# Patient Record
Sex: MALE | Race: WHITE
[De-identification: names, ages, dates, MRNs, and addresses within clinical notes are randomized per-mention and may not be internally consistent; named-entity substitution may affect disease eponyms.]

---

## 2017-10-11 NOTE — EDPHY
H & P


Stated Complaint: Not sleeping, depressed, anxious, vague SI (no plan)


Time Seen by Provider: 10/11/17 08:12


HPI/ROS: 





CHIEF COMPLAINT:  Depression, insomnia





HISTORY OF PRESENT ILLNESS:  The patient is a 37-year-old man who comes to the 

emergency department stating that he has not been able to sleep much in the 

last 6 days.  He is feeling depressed.  He denies suicidality.  He states that 

he went to an urgent care yesterday and was prescribed Ambien 10 mg. He took 

this and got about 3 hours of sleep and then could not sleep the rest of the 

night.  He does have a history of depression and once was on an SSRI but was 

able to taper himself off of it several years ago and began exercising more.  

He states however that he began having problems this January after the election 

when he irrationally quit his job.  Since that time has not been employed.  He 

states that he has been doing a lot of reading and taking online classes.  He 

denies drug or alcohol abuse.








REVIEW OF SYSTEMS:


Constitutional:  denies: chills, fever, recent illness, recent injury


EENTM: denies: blurred vision, double vision, nose congestion


Respiratory: denies: cough, shortness of breath


Cardiac: denies: chest pain, irregular heart rate, lightheadedness, palpitations


Gastrointestinal/Abdominal: denies: abdominal pain, diarrhea, nausea, vomiting, 

blood streaked stools


Genitourinary: denies: dysuria, frequency, hematuria, pain


Musculoskeletal: denies: joint pain, muscle pain


Skin: denies: lesions, rash, jaundice, bruising


Neurological: denies: headache, numbness, paresthesia, tingling, dizziness, 

weakness


Hematologic/Lymphatic: denies: blood clots, easy bleeding, easy bruising


Immunologic/allergic: denies: HIV/AIDS, transplant








EXAM:


GENERAL:  Well-appearing, well-nourished and in no acute distress.


HEAD:  Atraumatic, normocephalic.


EYES:  Pupils equal round and reactive to light, extraocular movements intact, 

sclera anicteric, conjunctiva are normal.


ENT:  TMs normal, nares patent, oropharynx clear without exudates.  Moist 

mucous membranes.


NECK:  Normal range of motion, supple without lymphadenopathy or JVD.


LUNGS:  Breath sounds clear to auscultation bilaterally and equal.  No wheezes 

rales or rhonchi.


HEART:  Regular rate and rhythm without murmurs, rubs or gallops.


ABDOMEN:  Soft, nontender, normoactive bowel sounds.  No guarding, no rebound.  

No masses appreciated.


BACK:  No CVA tenderness, no spinal tenderness, step-offs or deformities


EXTREMITIES:  Normal range of motion, no pitting or edema.  No clubbing or 

cyanosis.


NEUROLOGICAL:  Cranial nerves II through XII grossly intact.  Normal speech, 

normal gait.  5/5 strength, normal movement in all extremities, normal sensation


PSYCH:  Normal mood, normal affect.


SKIN:  Depressed affect, slightly slow speech, answers all questions 

appropriately.





Source: Patient


Exam Limitations: No limitations





- Personal History


Current Tetanus Diphtheria and Acellular Pertussis (TDAP): Yes





- Medical/Surgical History


Hx Asthma: No


Hx Chronic Respiratory Disease: No


Hx Diabetes: No


Hx Cardiac Disease: No


Hx Renal Disease: No


Hx Cirrhosis: No


Hx Alcoholism: No


Other PMH: anxiety/panic attacks





- Family History


Significant Family History: No pertinent family hx





- Social History


Smoking Status: Current every day smoker


Alcohol Use: Sober


Drug Use: None


Constitutional: 


 Initial Vital Signs











Temperature (C)  36.7 C   10/11/17 07:51


 


Heart Rate  67   10/11/17 07:51


 


Respiratory Rate  16   10/11/17 07:51


 


Blood Pressure  135/79 H  10/11/17 07:51


 


O2 Sat (%)  98   10/11/17 07:51








 











O2 Delivery Mode               Room Air














Allergies/Adverse Reactions: 


 





No Known Allergies Allergy (Unverified 10/11/17 07:55)


 








Home Medications: 














 Medication  Instructions  Recorded


 


Temazepam [Restoril 15 MG (*)] 15 mg PO HSPRN PRN #10 cap 10/11/17














Medical Decision Making


ED Course/Re-evaluation: 





10:00  a.m. the patient is medically clear, awaiting psychiatric evaluation





11:45 a.m. the patient has been evaluated by Mental Health.  He is not 

suicidal.  He contracts for safety.  He will follow up with the walk-in clinic.

  I will prescribe her Restoril an attempt to help with insomnia.  Discussed 

indications for returning.


Differential Diagnosis: 





Partial list of the Differential diagnosis considered include but were not 

limited to;  insomnia, depression and although unlikely based on the history 

and physical exam, I also considered bipolar, head injury, infection, psychosis

, suicidality.  I discussed these differential diagnoses and the plan with the 

patient as well as the usual and expected course.  The patient understands that 

the diagnosis is provisional and that in medicine we are not always correct and 

that further workup is often warranted.  Usual and customary warnings were 

given.  All of the patient's questions were answered.  The patient was 

instructed to return to the emergency department should the symptoms at all 

worsen or return, otherwise to followup with the physician as we discussed.





- Data Points


Laboratory Results: 


 Laboratory Results





 10/11/17 09:12 





 10/11/17 09:12 











Departure





- Departure


Disposition: Home, Routine, Self-Care


Clinical Impression: 


Insomnia


Qualifiers:


 Insomnia type: unspecified Qualified Code(s): G47.00 - Insomnia, unspecified





Depression


Qualifiers:


 Depression Type: major depressive disorder Major depression recurrence: 

recurrent Active/Remission status: currently active Major depression episode 

severity: moderate Qualified Code(s): F33.1 - Major depressive disorder, 

recurrent, moderate





Condition: Fair


Instructions:  Temazepam (By mouth), Depression (ED), Insomnia (ED)


Referrals: 


NONE *PRIMARY CARE P,. [Primary Care Provider] - As per Instructions


[facility],. [Clinic] - As per Instructions


Prescriptions: 


Temazepam [Restoril 15 MG (*)] 15 mg PO HSPRN PRN #10 cap


 PRN Reason: Insomnia

## 2017-10-13 NOTE — EDPHY
H & P


Source: Patient





- Medical/Surgical History


Hx Asthma: No


Hx Chronic Respiratory Disease: No


Hx Diabetes: No


Hx Cardiac Disease: No


Hx Renal Disease: No


Hx Cirrhosis: No


Hx Alcoholism: No


Other PMH: anxiety/panic attacks





- Social History


Smoking Status: Current every day smoker


Time Seen by Provider: 10/13/17 18:21


HPI/ROS: 





CHIEF COMPLAINT:  depression, insomnia, suicidal ideation 





HISTORY OF PRESENT ILLNESS:  37-year-old male arrives via police on an M1 hold 

after endorsing suicidal ideation with plan to hang  himself.  He owns a gun.  

He denies homicidal ideation.  Denies complaints of physical pain or 

discomfort.  States that he has not slept in 6 days.  Use in the ER 2 days ago 

for complaints of depression insomnia, evaluated by mental health evaluated at 

time discharged home.











REVIEW OF SYSTEMS:


A ten point review of systems was performed and is negative with the exception 

of the items mentioned in the HPI








PAST MEDICAL & SURGICAL  HISTORY:  Depression.  Insomnia.





SOCIAL HISTORY: denies alcohol or drug use, denies methamphetamine use.    





FAMILY HISTORY: No pertinent family history    








************


PHYSICAL EXAM





(Prior to examination, patient consented to physical exam, hands were washed 

and my usual and customary physical exam procedures followed)


1) GENERAL: Well-developed, well-nourished, alert and oriented.  Depressed, 

flat affect .


2) HEAD: Normocephalic, atraumatic


3) HEENT: Pupils equal, round, reactive to light bilaterally.  Sclera 

anicteric. 


4) NECK: Full range of motion, no meningeal signs.


5) LUNGS: Clear auscultation bilaterally, no wheezes, no rhonchi, no 

retractions.   


6) HEART: Regular rate and rhythm, no murmur, no heave, no gallop.


7) ABDOMEN: No guarding, no rebound, no focal tenderness,


8) MUSCULOSKELETAL: No peripheral edema or discoloration.


9) BACK: , no obvious trauma, no visual or palpable abnormality. 


10) SKIN: No rash, no petechiae. 


[11) Psychiatric:  Patient is oriented X 3, there is no agitation.  Depressed, 

flat affect, intermittently crying








***************





DIFFERENTIAL DIAGNOSIS:  [in no particular order including but not limited to 

depression, suicidal ideation, homicidal ideation 


 (GABI Clifton Erika)


Constitutional: 





 Initial Vital Signs











Temperature (C)  36.9 C   10/13/17 18:20


 


Heart Rate  63   10/13/17 18:20


 


Respiratory Rate  16   10/13/17 18:20


 


Blood Pressure  137/85 H  10/13/17 18:20


 


O2 Sat (%)  98   10/13/17 18:20








 











O2 Delivery Mode               Room Air














Allergies/Adverse Reactions: 


 





No Known Allergies Allergy (Verified 10/13/17 18:51)


 








Home Medications: 














 Medication  Instructions  Recorded


 


Temazepam [Restoril 15 MG (*)] 15 mg PO HSPRN PRN #10 cap 10/11/17


 


Ambien  10/13/17














Medical Decision Making


ED Course/Re-evaluation: 





Patient has been accepted for admission to [facility] Dr. Enio Swenson accepting physician, SHRAVAN paperwork completed.  Care 

of patient under supervision of  secondary supervising physician Dr Hoover .  (

GABI Clifton Erika)





I did not see this patient while he was in the emergency department.  However 

his care was discussed with the PA while the patient was in the department.  I 

agree with treatment plan management (Erlin Hoover)





- Data Points


Laboratory Results: 





 Laboratory Results





 10/13/17 18:40 





 10/13/17 18:40 





 











  10/13/17 10/13/17 10/13/17





  18:40 18:40 18:40


 


WBC      7.16 10^3/uL 10^3/uL





     (3.80-9.50) 


 


RBC      4.77 10^6/uL 10^6/uL





     (4.40-6.38) 


 


Hgb      14.5 g/dL g/dL





     (13.7-17.5) 


 


Hct      42.0 % %





     (40.0-51.0) 


 


MCV      88.1 fL fL





     (81.5-99.8) 


 


MCH      30.4 pg pg





     (27.9-34.1) 


 


MCHC      34.5 g/dL g/dL





     (32.4-36.7) 


 


RDW      11.9 % %





     (11.5-15.2) 


 


Plt Count      229 10^3/uL 10^3/uL





     (150-400) 


 


MPV      9.2 fL fL





     (8.7-11.7) 


 


Neut % (Auto)      64.7 % %





     (39.3-74.2) 


 


Lymph % (Auto)      24.4 % %





     (15.0-45.0) 


 


Mono % (Auto)      9.6 % %





     (4.5-13.0) 


 


Eos % (Auto)      0.3 % L %





     (0.6-7.6) 


 


Baso % (Auto)      0.7 % %





     (0.3-1.7) 


 


Nucleat RBC Rel Count      0.0 % %





     (0.0-0.2) 


 


Absolute Neuts (auto)      4.63 10^3/uL 10^3/uL





     (1.70-6.50) 


 


Absolute Lymphs (auto)      1.75 10^3/uL 10^3/uL





     (1.00-3.00) 


 


Absolute Monos (auto)      0.69 10^3/uL 10^3/uL





     (0.30-0.80) 


 


Absolute Eos (auto)      0.02 10^3/uL L 10^3/uL





     (0.03-0.40) 


 


Absolute Basos (auto)      0.05 10^3/uL 10^3/uL





     (0.02-0.10) 


 


Absolute Nucleated RBC      0.00 10^3/uL 10^3/uL





     (0-0.01) 


 


Immature Gran %      0.3 % %





     (0.0-1.1) 


 


Immature Gran #      0.02 10^3/uL 10^3/uL





     (0.00-0.10) 


 


Sodium    137 mEq/L mEq/L  





    (134-144)  


 


Potassium    4.2 mEq/L mEq/L  





    (3.5-5.2)  


 


Chloride    102 mEq/L mEq/L  





    ()  


 


Carbon Dioxide    25 mEq/l mEq/l  





    (22-31)  


 


Anion Gap    10 mEq/L mEq/L  





    (8-16)  


 


BUN    15 mg/dL mg/dL  





    (7-23)  


 


Creatinine    0.9 mg/dL mg/dL  





    (0.7-1.3)  


 


Estimated GFR    > 60   





    


 


Glucose    100 mg/dL mg/dL  





    ()  


 


Calcium    9.7 mg/dL mg/dL  





    (8.5-10.4)  


 


Salicylates    < 1.0 mg/dL L mg/dL  





    (2.0-20.0)  


 


Urine Opiates Screen  NEGATIVE     





   (NEGATIVE)   


 


Acetaminophen    < 10 mcg/mL L mcg/mL  





    (10-30)  


 


Urine Barbiturates  NEGATIVE     





   (NEGATIVE)   


 


Ur Phencyclidine Scrn  NEGATIVE     





   (NEGATIVE)   


 


Ur Amphetamine Screen  NEGATIVE     





   (NEGATIVE)   


 


U Benzodiazepines Scrn  NON-NEGATIVE  H     





   (NEGATIVE)   


 


Urine Cocaine Screen  NEGATIVE     





   (NEGATIVE)   


 


U Marijuana (THC) Screen  NEGATIVE     





   (NEGATIVE)   


 


Ethyl Alcohol    < 10 mg/dL mg/dL  





    (0-10)  











Medications Given: 





 








Discontinued Medications





Lorazepam (Ativan)  1 mg PO EDNOW ONE


   Stop: 10/13/17 19:17


   Last Admin: 10/13/17 19:18 Dose:  1 mg


Nicotine (Nicoderm Cq)  21 mg TD EDNOW ONE


   Stop: 10/13/17 18:28


   Last Admin: 10/13/17 19:13 Dose:  21 mg








Departure





- Departure


Disposition: [facility] IP


Clinical Impression: 


 Suicidal ideation, Severe major depression





Condition: Fair


Referrals: 


Patient,NotPresent [Primary Care Provider] - As per Instructions

## 2017-10-14 NOTE — HOSPPROG
Hospitalist Progress Note


Assessment/Plan: 





Reason for consultation:  Medical evaluation





History of present illness: Pt is a 36yo M who p/w insomnia x 7 days. He felt 

like a switch turned on in his body and he has been unable to sleep. He has 

tried using Ambien and temazepam, but neither helped. He tried taking lorazepam 

2mg tabs, which helped slightly. He does not feel tired during the day. He 

admits that he has a lot of thoughts going through his mind and finds it 

difficult to shut them down to sleep. He does not consume caffeine. He does not 

do stimulating activities prior to going to sleep at night. He has been 

frustrated with lack of sleep and is wondering if there is some sort of nerve 

poisoning that he might have suffered. This has never happened to him in the 

past. 





Past medical history: none.





Past surgical history:  none.





Medications:  Please see med rec form.





Allergies:  NKA.





Social history: Denies alcohol or drug use. +cigarette smoker - 1ppd. 





Family history: Father - bipolar. 





Review of systems:  10 point review of systems was conducted and is negative 

except per HPI





Physical exam:


Vitals:  Reviewed


General: The patient is a male who is alert and in no acute distress. 


HEENT: normocephalic, extraocular movements intact, conjunctivae clear, no 

lesions on face. Nares and oral mucosa pink and moist. 


Neck: trachea midline, no visible masses, no external lesions. 


CV: +S1/S2, RRR, no MRG.


Resp: unlabored, CTAB no RRW.


Abd: soft and nondistended.


Musculoskeletal:  Normal gait.


Neuro: cranial nerves II  XII grossly intact. Intact gross motor and sensory 

function.


Psych: anxious mood/appropriate affect. 


Skin:  no pallor.





Labs: 


CBC-WBC 7, HGB 14.5, platelets 229. BMP:  Sodium 137, potassium 4.2, chloride 

102, CO2 25, BUN 15, creatinine 0.9, glucose 100, calcium 9.7.  U tox-positive 

for benzos.  Negative for alcohol in blood.





Impression and plan:


Insomnia


Query hypomanic or manic episode





-Pt is medically clear for Psych eval and Tx. 


-Consider to start Seroquel or trazodone to help pt sleep at night. 


-Currently pt has benzo prn insomnia which might help a little, but this is not 

a good long term Tx. 


-Cont nicotine patch. 





Objective: 


 Vital Signs











Temp Pulse Resp BP Pulse Ox


 


 37.2 C   74   14   125/75 H  98 


 


 10/13/17 23:54  10/14/17 01:02  10/14/17 01:02  10/14/17 01:02  10/14/17 01:02














ICD10 Worksheet


Patient Problems: 


 Problems











Problem Status Onset


 


Depression Acute  


 


Insomnia Acute

## 2017-10-14 NOTE — BAPA
[f rep st]



                                                  ADMISSION PSYCHIATRIC ASSESSMENT





DATE OF SERVICE:  10/14/2017



CHIEF COMPLAINT:  The patient says "I don't feel suicidal anymore.  I just want to go home and sleep 
in my own bed."



HISTORY OF PRESENT ILLNESS:  This is a 37-year-old man, mixed  descent, brought to the ED by raymond cervantes on an M1 hold after endorsing suicidal ideation with a plan to hang himself.  Also had a plan to 
shoot himself.  The patient initially went to the [facility] looking for h
elp.  They gave him an address for the [facility]SU.  He says that he got into a cab.  The cab took him to [facility]
t he thought was the address, and it turns out that it was a dance studio.  He says that he then went
 back to the walk-in clinic, and they told him that the CSU was in [place].  According to the cyndi quintero, he then just went home and says that the police showed up at his house with an Edge worker, an
d the Edge worker filled out an M1 hold, which states "Client states that he will kill himself if he 
goes home tonight.  Client states he cannot take it anymore, has not slept in 6 days.  Client is very
 anxious.  Client has access to a 0.380 Glock per client report."  The patient told the [facility] evaluator that he has felt depressed and sad over the past week.  Approximat
ely 6 days ago, he began to have almost nightly panic attacks.  Due to these, he has had very little 
sleep for multiple days.  His PCP prescribed him Ambien and then temazepam, neither were effective ac
cording to the patient.  On his own he took Benadryl.  It did not work either.  He tried running, an 
activity that helps him to relax and helped alleviate his depression in the past, but says that it wo
uld not help.  When he presented to the walk-in clinic earlier today, he describes feelings of hopele
ssness, fear, paranoia, insomnia, loss of interest in activities, and SI.  The patient says that he h
as been discouraged about his future because he was let go from his job in January.  It is not clear 
what the circumstances were.  He has not been able to find employment since then.  He feels like he i
s "a failure."  He says that there is not much that he does that he enjoys.  He feels guilty, is disa
ppointed in himself, more critical of himself than he used to be.  The patient says that he has not g
luke any mental health treatment for a long time.  It has been more than 10 years since he saw a cou
nselor or took any medication for his mood.  He says he has been living on saved income and trying to
 change careers, but says that he has not been very successful, and this has been a source of a lot o
f anxiety and frustration, which has been going on actually for several months.  He says it has just 
been worse over the past week or so that he started having panic attacks.  He said that he also ended
 on again, off again relationship with his girlfriend after 2 years recently.  A relative who has bee
n staying with him moved out.  He says that he has begun to wonder "Will I always be alone?"  He had 
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 because he was running out of money, and he needed to work again. He said he has been very
 discouraged because he did not hear back from any of the places that he applied.  He says that has g
luke him even more down and depressed, and made him feel more like a failure, but says that he has b
een waiting to hear back from one place in particular.



PAST PSYCHIATRIC HISTORY:  The patient states that he has felt anxious and depressed since he was a c
hild, though never formally diagnosed, and has never been as severe as he is experiencing now.  He ha
d 1 episode of depression in his early 20s that he says was similar to what he is experiencing now.  
He had been suspended for 2 years by  for setting off fire crackers.  He says that he felt very iso
lated at that time.  He lost most of his friends, and people that he knew had moved away, and he was 
2 years behind in college.  He says back then that he had thoughts similar to what he is having now, 
thinking "I would rather just die," but no plans and no attempts.  He did see a counselor during this
 time.  He also saw a psychiatrist who placed him on Paxil.  He says that the Paxil was "not a good e
xperience."  He said that it "didn't work out very well for me."  He felt like the medication was not
 helpful, and he said that he had some intolerable side effects, which he says had to do with feeling
 like he saw colors in his peripheral vision.  He denies any other side effects or adverse experience
s from being on the SSRI, but said that it made him not want to take medications again.  He says, "I 
really am not interested in being on antidepressants." 



The patient has no prior history of psychiatric hospitalizations, and has not seen a psychiatrist sin
ce the episode in his 20s.  He has also not seen a counselor since that time.  His PCP was prescribin
g medications for insomnia, but was not addressing his underlying depression or what sounds like 
jeff anxiety.  He was getting temazepam and Ambien, which he says were not very helpful.  When this MD
 met with the patient, the patient severely minimized his symptoms, and stated that he had checked hi
s e-mail this morning with the care coordinator, Carrie, and he said that he got a job offer from one 
of the places where he had applied.  He said that he felt "really stoked" and denied that he was feel
ing sad or depressed or anxious any longer.  He also slept 5-1/2 hours last night after taking 2 mg o
f Ativan, said it was "the best sleep I've had in a long time."  The patient feels like his mood has 
"turned the corner," and he no longer endorses feeling helpless or hopeless.  He no longer feels a fa
ilure.  He is not judgemental or self critical.  He denies anhedonia.  He is future oriented.  He say
s he is really excited to go to work for this company, and feels like all of his problems have been r
esolved overnight.  When MD attempted to ask the patient what had changed in terms of his coping stra
tegies or his resources to deal with adverse events, he acknowledged that things were really no diffe
rent.  When MD pointed out that the patient reports that he has had a lifelong problem with depressio
n and anxiety, particularly when circumstances in his life did not go well, the patient admitted that
 was true.  When MD asked what he planned to do in the future so that he did not get into a situation
 like he experienced in his early 20s and like he experienced over the last couple of months, the pat
effie had no real plan except that he said that he was willing to speak to a therapist.  He said "it w
ould probably help for me to have somebody to talk to."  The patient did not want to take medications
.  He refused to consider the option of being on an antidepressant, even when MD explained that SSRIs
, and SNRIs, and other similar medications in addition to being the first-line treatment for depressi
on or also anxiolytics, and they are also the first-line treatment for people with generalized anxiet
y disorder, as well as panic disorder.  The patient still declined to consent to taking medication.  
He said "now that I have heard about this job offer I am feeling better."  He denied any thoughts, pl
ans, or intents to hurt himself or anybody else.  He said "I have a list of names" of therapists that
 he had been given by friends, and he was going to call them, and he was going to make an appointment
 to see them as soon as possible.



ALLERGIES:  The patient reports that he has no known drug allergies.



CURRENT MEDICATIONS:  The patient is not currently taking any medications, although he has recently b
een taking temazepam, Ambien, and Benadryl for sleep.



PAST MEDICAL HISTORY:  He denies any past medical history.



PAST SURGERY HISTORY:  He denies any surgical history.



SOCIAL HISTORY:  The patient graduated college with a degree in math and computer science.  He lives 
alone in his own apartment in [place].  He states that he has several friends in [place] and in the [facility].  He left his job in January of 2017 under unclear circumstances, whether he was let 
go, fired, suspended, or quit.  He does not give any details.  The patient had a relative who had bee
n staying with him, but moved out recently.  He also ended is on again, off again relationship with h
is girlfriend after 2 years.



SUBSTANCE USE HISTORY:  The patient states that he drinks alcohol on the weekends.  He reports drinki
ng up to 3 beers and possibly 1 mixed drink, such as a Jennifer, per occasion.  He says that he does
 not drink Monday through Thursday, but drinks Friday, Saturday, and Sunday.  He states that he has b
een smoking marijuana regularly.  He says up until about a month ago he was smoking "heavily."  He wa
s vaping THC oil, and he says that he was smoking daily, but says that over the last 2 weeks he has n
ot smoked because he has been going on job interviews and having urine drug screens.  When MD asked, 
the patient denied using any other recreational substances, but he told the evaluator in the ED that 
he experimented with NMDA, LSD, and shrooms in the past, but did not say how recently.



FAMILY HISTORY:  The patient reports that his maternal grandmother has depression, and his brother se
es a counselor.  He does not know of anybody else in the family with a mental illness or with a subst
ance abuse problem.



LABS:  On admission are as follows:  White cell count was 7.16, hemoglobin was 14.5, hematocrit was 4
2, platelet count was 229, sodium was 137, potassium was 4.2, chloride was 102, BUN was 15, creatinin
e was 0.9, glucose was 100, calcium was 9.7 



Urine drug screen was positive for benzos, which he had been given in the ED, was negative for all ot
her drugs of abuse.  His salicylate and acetaminophen levels were both undetected.  His ethyl alcohol
 level was less than 10.



MENTAL STATUS EXAMINATION:  This is an average height, average build, otherwise healthy-appearing Asi
an American man.  He is pleasant and cooperative, but somewhat guarded and anxious during the intervi
ew with the MD.  His affect is anxious.  He is fidgeting, has difficulty making eye contact.  His moo
d he says "good."  His thought process is linear and goal directed.  His thought content reveals no e
vidence of psychosis.  He exhibits no signs or symptoms of cely.  He denies feeling depressed, sad, 
or hopeless at the current time, even though those were symptoms that he reported when he went to the
 walk-in clinic and to the ED evaluator last night.  He is alert and oriented x4.  His intellect appe
ars to be average, as evidenced by educational/occupational history, fund of knowledge, and vocabular
y.  His insight and judgment appear to be poor based on the fact that the patient is minimizing his s
ymptoms, and does not seem to be interested in treating what sounds like chronic depression and anxie
ty, which are exacerbated by situational stressors.



DIAGNOSES:  

1.  Major depressive disorder, recurrent, severe.

2.  Generalized anxiety disorder or anxiety not otherwise specified.

3.  Panic disorder without agoraphobia.

4.  Rule out substance induced mood disorder.

5.  Cannabis use disorder, severe.

6.  Alcohol use disorder, moderate.

7.  Psychosocial stressors include unemployed, financial problems, lack of social support, recent terry
ak up with his girlfriend.



PLAN OF TREATMENT:  

1.  Admit the patient to behavioral services inpatient unit on an M1 hold.

2.  Monitor closely for safety and on suicide precautions.  The patient is currently denying any thou
ghts, plans, or intent to hurt himself or anyone else.  He says he is no longer having suicidal thoug
hts, and that he feels "really stoked" about his future now that he has a job offer.

3.  The MD has reviewed the risks, benefits, side effects of several different antidepressant medicat
ions, including the class of medications, the SSRIs, that he has been on in the past.  MD went into s
ome detail to describe how the medications are helpful both for depression, as well as for treating a
nxiety, and panic disorder, and that it is better to treat anxiety with a long-term strategy rather t
reyes to treat it episodically, as he has been doing with benzodiazepines for panic and for insomnia.  
MD explained that these are symptoms of an underlying disorder that he is not currently being treated
 for.  The patient says that he understands this, but does not want to be on medication for his mood 
or for his anxiety.  He only wants to use medications as needed for insomnia.  He states that he is a
lso willing to talk to a therapist and a counselor.  MD said that it would be helpful for the patient
 to develop some coping strategies for dealing with stressors in his life when things seem overwhelmi
ng.  This is a significant trigger for the patient and has been, as the patient reports, for most of 
his life, but he has only ever addressed it once when he was in his 20s because he was referred by hi
[facility] to get mental health treatment, and now that he went to the walk-in clinic to get help, but
 does not want to pursue ongoing treatment now that he feels like things have "turned the corner" now
 that he has a job offer.

4.  We will engage the patient in individual, group, and milieu therapies.

5.  Estimated length of stay is 2-3 days.





Job #:  [id number]

## 2017-10-15 NOTE — SOAPPROG
SOAP Progress Note


Assessment/Plan: 


Assessment:








38 yo man with h/o depression, anxiety, cannabis use disorder and paranoid 

delusions. 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

t fears about colleagues talking about him. Brother also reports patient was 

worried that "people he saw with earbuds" were tracking him. Brother admits 

patient bought gun d/t fear and concerns. But brother says patient is usually 

able to hide his paranoia and delusions from others. 











10/15/17 13:28


1. MD talked to patient about trial of Zyprexa to help with thoughts, anxiety 

and to improve his sleep. He took one PRN dose of 5mg today and agreed to take 

one again tonight. 





Subjective: 


More collateral from brother indicates patient has likely had paranoid 

delusions since at least 12/2016 when he quit his job. [facility] has removed gun from 

patient's apartment. MD told patient that Zyprexa was likely to help with his 

worried and scared thoughts and help him be able to sleep better. After 

discussing r/b/se's, patient agreed to trial of Zypexa. He denies any SI/HI. 





Objective: 





 Vital Signs











Temp Pulse Resp BP Pulse Ox


 


 37.0 C   79   16   127/66 H  96 


 


 10/15/17 05:47  10/15/17 05:47  10/15/17 05:47  10/15/17 05:47  10/15/17 05:47








MSE: Lying in bed, c/o poor sleep (staff report he slept 6 hrs). Affect: 

Anxious  Mood: "Anxious"  TP: Disorganized, perseverative about sleep/anxiety  

TC: Denies any AH/VH, denies paranoia but this seems to underlie much of his 

anxiety, no SI/HI 





- Time Spent With Patient


Time Spent With Patient: 


20"








- Pending Discharge


Pending Discharge Within 24 Hours: No


Pending Discharge Within 48 Hours: No





ICD10 Worksheet


Patient Problems: 


 Problems











Problem Status Onset


 


Depression Acute  


 


Insomnia Acute

## 2017-10-16 NOTE — BDS
[f 
rep st]



                                                  BEHAVIORAL HEALTH DISCHARGE 
SUMMARY



DISCHARGE DATE 10/16/17



ADMITTING DIAGNOSES:  Major depressive disorder, recurrent, severe.  
Generalized anxiety disorder.  Panic disorder.  Suicidal ideation.



IDENTIFICATION:  This is a 37-year-old  male who is single with no 
children, who lives in an apartment with a dog and a cat.  Both his father and 
his brother live in the [place] area.  He has no prior history of psychiatric 
treatment.  He is a  but is unemployed currently.



BRIEF PSYCHIATRIC HISTORY:  The patient denies past psychiatric hospitalizations
, suicide attempts, violence toward others or any current outpatient mental 
health treatment.  Patient had gone to the [place] walk in clinic twice in the 
week prior to admission with complaints of severe insomnia.



BRIEF MEDICAL HISTORY:  Patient denies any traumatic brain injuries, seizures, 
or chronic severe medical problems.



FAMILY HISTORY:  The patient's father reportedly has bipolar disorder.



REASON FOR ADMISSION:  The patient was taken to the emergency department on a 
M1 mental health hold.  The patient reportedly went to a walk-in clinic here in 
[place] twice in the past week with complaints of insomnia.  During the 2nd 
visit, he reported suicidal thoughts to shoot himself or hang himself and was 
placed on an M1 hold and transferred to the emergency department.  The patient 
in the emergency department received 1 mg of Ativan.



INITIAL EXAM:  The patient is a thin male without tremors or weakness, who was 
a poor historian, who reported severe anxiety, panic attacks, depression 
symptoms, and brief thoughts to harm himself.  He denied violent thoughts or 
auditory hallucinations.  There was concern that he had had past paranoia about 
being monitored, but minimized these symptoms.



HOSPITAL COURSE:  The patient was started on olanzapine for insomnia, paranoia 
due to concern that the patient either had insomnia secondary to bipolar 
disorder or from a psychotic episode.  The patient tolerated olanzapine 5 mg at 
bedtime and had improvement in sleep, then had olanzapine 5mg twice on day #2.  
He had baseline blood work that showed a CBC was normal, BMP was within normal 
limits, urine drug screen was only positive for benzodiazepines.  However, the 
patient had been taking zolpidem and temazepam during the previous week.  The 
patient reported in the week prior to admission having severe insomnia (
multiple days without sleep) as well as racing thoughts, agitation, mood swings
, severe anxiety, and feelings of restlessness.



The patient, on the unit, was able to sleep 8 hours with a combination of 
olanzapine and lorazepam.  He received 5 mg of olanzapine during the day and 5 
mg at night.  When I evaluated the patient on October 16, he reported that he 
had slept well.  He reported a stable mood and denied severe anxiety.  He 
reported that he felt improved and wanted to be discharged.  The patient's 
brother reports the patient does not have a prior history of severe mental 
illness, but may have had some paranoia in the past and some depression 
symptoms in the past year concurrent with unemployment.  The patient's brother 
and the patient's father are agreeable to come to the unit to review the patient
's discharge paperwork and assist him in getting his prescriptions filled, 
monitoring his medication compliance and assisting him in getting to outpatient 
followup.  The patient was agreeable to this plan. 



The patient was calm and appropriate on the unit and was able to attend groups.
  He did not display any reckless or impulsive behavior on the unit.  Did not 
appear disorganized and was appropriate in safety planning and reviewing coping 
skills to use if he had suicidal thoughts.  Of note, the patient's brother and 
father had already removed the patient's gun from his home.  The patient denied 
having paranoia on the unit.  He denied any further thoughts to harm himself on 
the unit as well.



CONDITION AT DISCHARGE:  He is an alert,  male in no acute distress.  
He is ambulatory, cooperative and pleasant.  His speech is regular rate and 
rhythm.  His thoughts are organized.  He denies any thoughts to hurt himself or 
others.  He denies auditory hallucinations or paranoia.  He has fair insight, 
fair memory and appropriate judgment.



DISCHARGE DIAGNOSES:  Bipolar disorder type 1, depressed with mixed and 
psychotic features; Insomnia - resolved; suicidal ideation - resolved



DISCHARGE MEDICATIONS:  Olanzapine 10 mg p.o. at bedtime, lorazepam 0.5 mg p.o. 
at bedtime p.r.n., insomnia #10.



DISPOSITION:  The patient will be leaving the hospital with either his father 
or his brother and will stay with them for several nights prior to returning to 
his apartment.  The patient's brother and father have been watching the patient'
s dog and cat and will assist the patient with followup appointments.  The 
patient will go to [facility] for further mental health treatment.



LEGAL STATUS:  The patient was admitted on an M1 hold.  The patient was 
unwilling to stay in the hospital on a voluntary basis beyond the expiration of 
the mental health hold.  The patient has repeatedly denied being a danger to 
himself after receiving treatment here so will not file a short term 
certification at this time.  The patient will be discharged and receiving 
outpatient treatment on a voluntary basis.



INFORMED CONSENT:  The patient was given education about bipolar disorder as 
well as the risks and benefits of the olanzapine, including the risks of weight 
gain, diabetes, hyperlipidemia, and tardive dyskinesia.  Patient was also given 
information about Lithium as alternative treatment option.  He was also given 
warning about lorazepam causing disinhibition, driving impairment or severe 
sedation.





Job #:  [id number]

MTDD

## 2017-10-16 NOTE — SOAPPROG
SOAP Progress Note


Assessment/Plan: 


Assessment:





Bipolar Disorder I, depressed with mixed features and psychotic features


Insomnia





Patient had severe insomnia with mood swings and brief SI prior to admit.  Has 

been on unit >48 hours without further SI.


Patient reports improved sleep and mood with Olanzapine and denies dangerous 

thoughts.


Patient not willing to stay on inpatient unit but is willing to take medication 

after discharge and attend follow up appointments, is willing to stay with 

family in short term.





Plan:


Discussed voluntary treatment to clarify mood stability; patient not willing to 

stay at this time


Spoke on speaker phone with brother and patient.  Brother reports gun removed 

from home.  Father and brother willing to  patient at discharge and 

assist him in filling prescriptions and arranging/attending outpatient MH 

follow up


Provided patient with education about bipolar disorder and risks/benefits of 

mood stabilizer medications such as lithium or olanzapine


Reviewed risk of weight gain, diabetes, tardive dyskinesia with Olanzapine


Discussed dangers of sedative medication


Plan discharge later today with Olanzapine 10mg PO QHS (#30), Ativan 0.5mg PO 

QHS PRN insomnia (#10) and [facility] follow up





10/16/17 11:16





Subjective: 





"I feel pretty good today, slept better"





Patient reports that overall he is feeling improved.  Reports sleeping 8 hours.

  Tolerated 2 doses of Olanzapine 5mg yesterday without side effects.  Denies 

paranoia about being monitored.  Reports feeling 'depressed because I am here, 

I would feel better if I was at home.'  Reports living independently with a dog 

and a cat, had worked at [employer] for 10 years prior to quitting job.  Reports 1-

2 weeks of severe insomnia with mood swings, racing thoughts, loud speech, 

irritability, and brief SI to shoot or hang self.  Reports no longer having 

suicidal thoughts and feels mood is stable.  Denies physical complaints.  

Reports he is willing to take psychiatric medication after discharge and stay 

short term with family and get outpatient  follow up.  Reports no clear 

benefit from trial of Temazepam and Ambien in past week but felt lorazepam may 

have helped with sleep.  Reports he is unwilling to stay in hospital voluntary 

for further treatment.


Objective: 





 Vital Signs











Temp Pulse Resp BP Pulse Ox


 


 36.8 C   66   14   119/63   99 


 


 10/16/17 06:00  10/16/17 06:00  10/16/17 06:00  10/16/17 06:00  10/16/17 06:00








Alert  male.  Cooperative.  Appears thin.  Speech RRR.  Mood 'pretty 

good'  Thoughts organized.  Denies SI or HI.  Denies AH or paranoia.  Good 

memory.  Improved insight.  Appropriate judgment.





- Time Spent With Patient


Time Spent With Patient: 





45





- Pending Discharge


Pending Discharge Within 24 Hours: Yes


Pending Discharge Date: 10/16/17


Pending Discharge Time: 17:00





Physical Exam





- Physical Exam


General Appearance: alert


Neuro/Psych: no motor/sensory deficits





ICD10 Worksheet


Patient Problems: 


 Problems











Problem Status Onset


 


Depression Acute  


 


Insomnia Acute

## 2017-11-20 ENCOUNTER — HOSPITAL ENCOUNTER (EMERGENCY)
Dept: HOSPITAL 80 - FED | Age: 37
Discharge: HOME | End: 2017-11-20
Payer: MEDICAID

## 2017-11-20 VITALS
OXYGEN SATURATION: 96 % | SYSTOLIC BLOOD PRESSURE: 132 MMHG | TEMPERATURE: 98.1 F | HEART RATE: 70 BPM | DIASTOLIC BLOOD PRESSURE: 81 MMHG

## 2017-11-20 VITALS — RESPIRATION RATE: 16 BRPM

## 2017-11-20 DIAGNOSIS — F17.200: ICD-10-CM

## 2017-11-20 DIAGNOSIS — S01.81XA: Primary | ICD-10-CM
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
## 2017-11-20 PROCEDURE — 0HQ1XZZ REPAIR FACE SKIN, EXTERNAL APPROACH: ICD-10-PCS

## 2017-11-20 NOTE — EDPHY
H & P


Time Seen by Provider: 11/20/17 21:10


HPI/ROS: 





CHIEF COMPLAINT:  facial injury 





HISTORY OF PRESENT ILLNESS:  37-year-old male states that last evening he 

sustained a mechanical fall impacted his right lateral face against the door 

frame.  No loss of consciousness.  This was mechanical non syncopal episode.  

He went to [facility] for evaluation of lacerations referred to the 

ER for laceration repair.  He denies ocular involvement.  Denies visual acuity 

changes.  Denies headache.  Denies nausea or vomiting.  Denies midline C-spine 

pain.  Denies peripheral paresthesia, weakness, numbness.  Denies chest pain or 

trauma.  Denies back pain or trauma.  











REVIEW OF SYSTEMS:


A ten point review of systems was performed and is negative with the exception 

of the items mentioned in the HPI








PAST MEDICAL/SURGICAL HISTORY:  Tetanus up-to-date.  no anticoagulant use, no 

relevant medical/surgical history





SOCIAL HISTORY: denies alcohol use at time of incident





*********





PHYSICAL EXAM 





1) GENERAL: Well-developed, well-nourished, alert and oriented.  Appears to be 

in no acute distress. Answering questions appropriately.


2) HEAD: Normocephalic, atraumatic


3) HEENT: Pupils equal, round, reactive to light bilaterally. Negative Horners.

  1 cm laceration bridge of nose.  1 cm laceration inferior to the right mid 

eyebrow.  Laceration inferior to the right lower lateral lid which does not 

cross the lid margin measuring 1 cm.  No hyphema.  Inferior to the right lower 

lid his a 1.5 cm laceration.  No subconjunctival hemorrhage.  Nasopharynx, 

oropharynx, clear.   No deformity or angulation of nose.  No septal hematoma.  

No rhinorrhea. No oral trauma. Ears bilaterally with normal tympanic membranes.

  No hemotympanum.  No fluid or blood in the external auditory canal.  No 

raccoon eyes.  No Will sign.  Teeth are normally aligned with no gross 

malocclusion, TMJ bilaterally nontender, facial bones nontender including the 

zygomatic arch, maxilla mandible.


4) NECK:  No cervical collar is on. Posterior cervical spine is nontender, no 

stepoff, no effusion. Full range of motion which does not elicit any midline 

cervical spine pain, no posterior midline tenderness, no step-off. 


5) LUNGS: Clear to auscultation bilaterally, no wheezes, no rhonchi, no 

retractions.  No obvious signs of trauma.  No chest wall pain.  No flaring, no 

grunting.  Moving symmetrically.  No crepitus. 


6) HEART: Regular rate and rhythm, 


7) ABDOMEN: No guarding, no rebound, no focal tenderness, no peritoneal signs, 

no signs of trauma, no ecchymosis


8) MUSCULOSKELETAL: Moving all extremities, no focal areas of tenderness, no 

obvious trauma.


9) BACK: No midline vertebral tenderness, no fluctuance, no step-off, no 

obvious trauma, no visual or palpable abnormality.


10) SKIN:  laceration to face n





***********





DIFFERENTIAL DIAGNOSIS: [in no particular order including but not limited to 

laceration, abrasion, nasal fracture  


Smoking Status: Current every day smoker


Constitutional: 


 Initial Vital Signs











Temperature (C)  37 C   11/20/17 20:46


 


Heart Rate  73   11/20/17 20:46


 


Respiratory Rate  16   11/20/17 20:46


 


Blood Pressure  133/75 H  11/20/17 20:46


 


O2 Sat (%)  98   11/20/17 20:46








 











O2 Delivery Mode               Room Air














Allergies/Adverse Reactions: 


 





No Known Allergies Allergy (Verified 10/13/17 18:51)


 








Home Medications: 














 Medication  Instructions  Recorded


 


Trintellix  11/20/17


 


Zyprexa  11/20/17














MDM/Departure





- MDM


Procedures: 





Procedure:  Laceration repair.


 


I explained the indications, risks and benefits for both laceration repair and 

anesthetic administration. Verbal consent was obtained from the patient .  All 

the lacerations were anesthetized using 0.5% bupivicaine without epinephrine 

digital nerve block. After anesthetic administered the patient was observed for 

a period of time and had no apparent adverse effects. The wound was cleaned, 

prepped, draped in normal sterile fashion and explored to its base.  No foreign 

body seen, no foreign bodies palpated. There were no deep structures involved. 

The laceration on the bridge of nose and right inferior eyebrow were repaired 

with tissue adhesive.  The right inferior lower lid laceration repaired with 5 

simple interrupted 6 0 Prolene sutures pain careful attention to realign 

anatomic landmarks.     The wound repair was complex.  The procedure was 

performed by myself. Patient has been informed that scarring will occur, 

although efforts have been made to minimize this.


ED Course/Re-evaluation: 





After evaluating the patient's laceration I did not think that plastics surgery 

consultation is indicated as this laceration did not cross the lid margin I 

felt comfortable with primary wound closure in the emergency department.  This 

was discussed with secondary supine position Dr. Aiden Sauer in the ER.  Wound 

closure was subsequently performed.  He is also noted to have a blunt injury to 

the bridge of his nose.  We discussed possibility of acute nasal fracture.  Do 

not think that emergent imaging studies indicated.  Given follow-up information 

with otolaryngology.  To return to ER in 5 days for suture removal.  Usual and 

customary discharge precautions instructions provided.  He feels comfortable 

being discharged.





- Depart


Disposition: Home, Routine, Self-Care


Clinical Impression: 


 Possible nasal fracture





Facial laceration


Qualifiers:


 Encounter type: initial encounter Qualified Code(s): S01.81XA - Laceration 

without foreign body of other part of head, initial encounter





Condition: Good


Instructions:  Nasal Fracture (ED), Laceration (ED)


Additional Instructions: 


Return to the ER in 5 days for suture removal.


Referrals: 


Beny Mccormack MD [Medical Doctor] - 2-3 days, call for appt. (Dr Mccormack is 

an ear nose throat doctor.)


Return, to the ER in 5 days for suture removal [Other] - As per Instructions

## 2017-12-03 ENCOUNTER — HOSPITAL ENCOUNTER (EMERGENCY)
Dept: HOSPITAL 80 - FED | Age: 37
Discharge: HOME | End: 2017-12-03
Payer: MEDICAID

## 2017-12-03 VITALS — TEMPERATURE: 97.7 F | RESPIRATION RATE: 16 BRPM

## 2017-12-03 VITALS — SYSTOLIC BLOOD PRESSURE: 137 MMHG | HEART RATE: 71 BPM | DIASTOLIC BLOOD PRESSURE: 82 MMHG | OXYGEN SATURATION: 95 %

## 2017-12-03 DIAGNOSIS — G47.00: Primary | ICD-10-CM

## 2017-12-03 DIAGNOSIS — F17.200: ICD-10-CM

## 2017-12-03 NOTE — EDPHY
H & P


Time Seen by Provider: 12/03/17 14:55


HPI/ROS: 





CHIEF COMPLAINT:  Insomnia





HISTORY OF PRESENT ILLNESS:  Patient is a 37-year-old male with a history of 

anxiety and panic attack who presents to the emergency department complaining 

of insomnia.  He states his symptoms started when he was starting a new job 

recently.  He states he has not been able to sleep well for 2 weeks.  Patient 

feels "like a zombie.  "Patient has no headache.  No chest pain or shortness of 

breath.  No abdominal pain.  No recent trauma or injury.  The patient drinks 

alcohol occasionally.  Occasionally smokes marijuana.





REVIEW OF SYSTEMS:  


My complete review of systems is negative except as mentioned in the HPI.


Past Medical/Surgical History: 





Includes anxiety, panic attacks





Social history:  Patient occasionally uses THC.  Occasionally drinks alcohol.


Smoking Status: Current every day smoker


Physical Exam: 





Vitals noted


GENERAL:  Well-appearing, in no acute distress, alert.


HEENT:  Eyes normal to inspection, normal pharynx, no signs of dehydration.


NECK:  No thyromegaly, no lymphadenopathy, supple.


RESPIRATORY:  Clear to auscultation bilaterally, no rales, rhonchi or wheezing.


CVS:  Regular rate and rhythm, no rubs, murmurs, or gallops.


ABDOMEN:  Soft, nontender, nondistended, no organomegaly.


BACK:  Normal to inspection, no CVA tenderness.


SKIN:  Normal color, no rash, warm, dry.  No pallor.


EXTREMITIES:  No pedal edema, no calf tenderness, no Homans sign or cords, no 

joint swelling.


NEURO/PSYCH:  Alert and oriented, normal mood and affect, normal motor sensory 

exam.  


Constitutional: 





 Initial Vital Signs











Temperature (C)  36.5 C   12/03/17 14:21


 


Heart Rate  76   12/03/17 14:21


 


Respiratory Rate  16   12/03/17 14:21


 


Blood Pressure  115/77   12/03/17 14:21


 


O2 Sat (%)  98   12/03/17 14:21








 











O2 Delivery Mode               Room Air














Allergies/Adverse Reactions: 


 





No Known Allergies Allergy (Verified 12/03/17 14:20)


 








Home Medications: 














 Medication  Instructions  Recorded


 


Zolpidem Tartrate [Ambien 10 mg] 10 mg PO HS #5 tablet 12/03/17














Medical Decision Making


ED Course/Re-evaluation: 





In the emergency department I discussed possible etiologies with the patient.  

I answered all his questions.  The patient will be given a prescription of 

Ambien 10 mg tablets.  He states he has used Ambien before tolerated it well.  

I gave him warnings regarding Ambien use.  He understands he needs to take this 

medication when he is in bed and ready to go to sleep.  He is given warnings 

prior to leaving.  He is given follow-up with the primary care physician.


Differential Diagnosis: 





My differential includes but is not limited to anxiety, panic attack, insomnia, 

medication abuse





Departure





- Departure


Disposition: Home, Routine, Self-Care


Clinical Impression: 


Insomnia


Qualifiers:


 Insomnia type: unspecified Qualified Code(s): G47.00 - Insomnia, unspecified





Condition: Good


Instructions:  Insomnia (ED)


Additional Instructions: 


Return with worsening symptoms or concerns.  You need close follow-up with her 

primary care physician.  You will not be given refills of sleeping medication 

from the emergency department.  The only take your sleeping pill while in bed 

ready to go to sleep.  Follow the directions of the prescription.


Referrals: 


Fran Belcher MD [Medical Doctor] - 3-4 days, if not improved


Prescriptions: 


Zolpidem Tartrate [Ambien 10 mg] 10 mg PO HS #5 tablet

## 2018-02-26 ENCOUNTER — HOSPITAL ENCOUNTER (EMERGENCY)
Dept: HOSPITAL 80 - FED | Age: 38
Discharge: HOME | End: 2018-02-26
Payer: MEDICAID

## 2018-02-26 VITALS
DIASTOLIC BLOOD PRESSURE: 80 MMHG | TEMPERATURE: 98.1 F | SYSTOLIC BLOOD PRESSURE: 139 MMHG | OXYGEN SATURATION: 97 % | RESPIRATION RATE: 16 BRPM | HEART RATE: 75 BPM

## 2018-02-26 DIAGNOSIS — F10.239: Primary | ICD-10-CM

## 2018-02-26 DIAGNOSIS — F17.200: ICD-10-CM

## 2018-02-26 LAB — PLATELET # BLD: 166 10^3/UL (ref 150–400)

## 2018-02-26 PROCEDURE — G0480 DRUG TEST DEF 1-7 CLASSES: HCPCS

## 2018-02-26 NOTE — EDPHY
H & P


Stated Complaint: ETOH withdrawl





- Personal History


Tetanus Vaccine Date: LESS THAN 10 YEARS





- Medical/Surgical History


Hx Asthma: No


Hx Chronic Respiratory Disease: No


Hx Diabetes: No


Hx Cardiac Disease: No


Hx Renal Disease: No


Hx Cirrhosis: No


Hx Alcoholism: No


Hx HIV/AIDS: No


Hx Splenectomy or Spleen Trauma: No


Other PMH: PMHx: anxiety/panic attacks/insomnia, ETOH abuse.  PSHx: denies





- Social History


Smoking Status: Current every day smoker


Time Seen by Provider: 02/26/18 12:06


HPI/ROS: 





CHIEF COMPLAINT:  Suicidal ideation





HISTORY OF PRESENT ILLNESS:  37-year-old male history of depression, alcoholism

, in the ER with father and brother complaining of acute alcohol withdrawal 

symptoms, suicidal ideation with plan possibly to hang himself.  His brother 

discovered a noose in his room.  Patient and father state that he is very self 

critical.  Patient is requesting assistance from his alcoholism and depression.

  No seizure.  No hallucination








REVIEW OF SYSTEMS:


A ten point review of systems was performed and is negative with the exception 

of the items mentioned in the HPI








PAST MEDICAL & SURGICAL  HISTORY:  Depression.  Alcoholism.





SOCIAL HISTORY: Last drink of alcohol was 3 days ago    





FAMILY HISTORY: No pertinent family history    








************


PHYSICAL EXAM





(Prior to examination, patient consented to physical exam, hands were washed 

and my usual and customary physical exam procedures followed)


1) GENERAL: Well-developed, well-nourished, alert and oriented.  Tearful.


2) HEAD: Normocephalic, atraumatic


3) HEENT: Pupils equal, round, reactive to light bilaterally.  Sclera 

anicteric. 


4) NECK: Full range of motion, no meningeal signs.


5) LUNGS: Clear auscultation bilaterally, no wheezes, no rhonchi, no 

retractions.   


6) HEART: Regular rate and rhythm, no murmur, no heave, no gallop.


7) ABDOMEN: No guarding, no rebound, no focal tenderness,,


8) MUSCULOSKELETAL: No peripheral edema or discoloration.


9) BACK: No visual or palpable abnormality. 


10) SKIN: No rash, no petechiae. 


11) Psychiatric:  Patient is oriented X 3, he is tearful.  Tremulous








***************





DIFFERENTIAL DIAGNOSIS:  In no particular order including but not limited to 

depression, suicidal ideation, homicidal ideation  


 (GABI Clifton Erika)


Constitutional: 


 Initial Vital Signs











Heart Rate  71   02/26/18 11:51


 


Respiratory Rate  24 H  02/26/18 11:51


 


Blood Pressure  149/99 H  02/26/18 11:51


 


O2 Sat (%)  99   02/26/18 11:51








 











O2 Delivery Mode               Room Air














Allergies/Adverse Reactions: 


 





No Known Allergies Allergy (Verified 02/26/18 11:50)


 











Medical Decision Making


ED Course/Re-evaluation: 





12:25 p.m.:  Consultation with Dr. Masha Welsh this patient was placed on M1 

hold as he actively endorses suicidal ideations with plan possibly to hang 

himself.  His brother discovered a tied noose in his room, is father's taken is 

going to wait.  Patient states that he is very self critical with suicidal 

thoughts.  I believe he has an imminent danger to himself.  I discussed with 

patient and he is agreeable with this.





5:30 p.m.:  Care patient transferred to Dr. Anderson Gallego. (GABI Clifton Erika)


Other Provider: 





I assumed care of the patient at 5:00 p.m. Pending psychiatric disposition.





Update at 10:00 p.m.:  The patient was evaluated by [facility].  

The patient is currently clara for safety and is not suicidal.  The 

patient would [facility] 

[facility] with his alcohol abuse.  The patient's father has consented to this 

plan and is comfortable with the disposition.  [facility] has 

recommended that the M1 psychiatric hold the vacated in the patient be 

discharged to the [facility].





 (Oren Gallego)





- Data Points


Laboratory Results: 


 Laboratory Results





 02/26/18 12:03 





 02/26/18 12:03 





 











  02/26/18 02/26/18 02/26/18





  12:03 12:03 12:03


 


WBC      7.67 10^3/uL 10^3/uL





     (3.80-9.50) 


 


RBC      4.83 10^6/uL 10^6/uL





     (4.40-6.38) 


 


Hgb      14.9 g/dL g/dL





     (13.7-17.5) 


 


Hct      43.9 % %





     (40.0-51.0) 


 


MCV      90.9 fL fL





     (81.5-99.8) 


 


MCH      30.8 pg pg





     (27.9-34.1) 


 


MCHC      33.9 g/dL g/dL





     (32.4-36.7) 


 


RDW      13.1 % %





     (11.5-15.2) 


 


Plt Count      166 10^3/uL 10^3/uL





     (150-400) 


 


MPV      9.6 fL fL





     (8.7-11.7) 


 


Neut % (Auto)      74.0 % %





     (39.3-74.2) 


 


Lymph % (Auto)      12.4 % L %





     (15.0-45.0) 


 


Mono % (Auto)      12.4 % %





     (4.5-13.0) 


 


Eos % (Auto)      0.3 % L %





     (0.6-7.6) 


 


Baso % (Auto)      0.5 % %





     (0.3-1.7) 


 


Nucleat RBC Rel Count      0.0 % %





     (0.0-0.2) 


 


Absolute Neuts (auto)      5.68 10^3/uL 10^3/uL





     (1.70-6.50) 


 


Absolute Lymphs (auto)      0.95 10^3/uL L 10^3/uL





     (1.00-3.00) 


 


Absolute Monos (auto)      0.95 10^3/uL H 10^3/uL





     (0.30-0.80) 


 


Absolute Eos (auto)      0.02 10^3/uL L 10^3/uL





     (0.03-0.40) 


 


Absolute Basos (auto)      0.04 10^3/uL 10^3/uL





     (0.02-0.10) 


 


Absolute Nucleated RBC      0.00 10^3/uL 10^3/uL





     (0-0.01) 


 


Immature Gran %      0.4 % %





     (0.0-1.1) 


 


Immature Gran #      0.03 10^3/uL 10^3/uL





     (0.00-0.10) 


 


Sodium    136 mEq/L mEq/L  





    (135-145)  


 


Potassium    3.4 mEq/L L mEq/L  





    (3.5-5.2)  


 


Chloride    100 mEq/L mEq/L  





    ()  


 


Carbon Dioxide    22 mEq/l mEq/l  





    (22-31)  


 


Anion Gap    14 mEq/L mEq/L  





    (8-16)  


 


BUN    7 mg/dL mg/dL  





    (7-23)  


 


Creatinine    0.7 mg/dL mg/dL  





    (0.7-1.3)  


 


Estimated GFR    > 60   





    


 


Glucose    136 mg/dL H mg/dL  





    ()  


 


Calcium    9.8 mg/dL mg/dL  





    (8.5-10.4)  


 


Salicylates    < 1.0 mg/dL L mg/dL  





    (2.0-20.0)  


 


Urine Opiates Screen  NEGATIVE     





   (NEGATIVE)   


 


Acetaminophen    < 10 mcg/mL L mcg/mL  





    (10-30)  


 


Urine Barbiturates  NEGATIVE     





   (NEGATIVE)   


 


Ur Phencyclidine Scrn  NEGATIVE     





   (NEGATIVE)   


 


Ur Amphetamine Screen  NEGATIVE     





   (NEGATIVE)   


 


U Benzodiazepines Scrn  NEGATIVE     





   (NEGATIVE)   


 


Urine Cocaine Screen  NEGATIVE     





   (NEGATIVE)   


 


U Marijuana (THC) Screen  NON-NEGATIVE  H     





   (NEGATIVE)   


 


Ethyl Alcohol    < 10 mg/dL mg/dL  





    (0-10)  











Medications Given: 


 





Lorazepam (Ativan Injection)  0 mg IVP Q1H PRN; Protocol


   PRN Reason: Alcohol Withdrawal w/IV access


   Stop: 02/27/18 00:21


   Last Admin: 02/26/18 12:47 Dose:  2 mg





Discontinued Medications





Lorazepam (Ativan)  1 mg PO EDNOW ONE


   Stop: 02/26/18 17:18


   Last Admin: 02/26/18 17:21 Dose:  1 mg


Lorazepam (Ativan)  1 mg PO EDNOW ONE


   Stop: 02/26/18 20:01


   Last Admin: 02/26/18 21:27 Dose:  Not Given








Departure





- Departure


Disposition: Home, Routine, Self-Care


Clinical Impression: 


 Alcohol withdrawal





Condition: Good


Instructions:  Alcohol Dependence (ED)


Additional Instructions: 


1. Please follow-up with the mental health resources provided in the ED today.


2. [facility] does operate a 24/7 psychiatric crisis unit located 

at [address] [place] [address]. The telephone number for the 24 hour crisis center is [phone].


3. Please return to the ED if you are feeling suicidal, having thoughts of 

harming yourself/others or should you feel unsafe or have worsening symptoms.











Referrals: 


[facility] [Outside] - As per Instructions

## 2018-02-27 ENCOUNTER — HOSPITAL ENCOUNTER (EMERGENCY)
Dept: HOSPITAL 80 - FED | Age: 38
Discharge: HOME | End: 2018-02-27
Payer: MEDICAID

## 2018-02-27 VITALS
TEMPERATURE: 98.6 F | OXYGEN SATURATION: 97 % | HEART RATE: 85 BPM | SYSTOLIC BLOOD PRESSURE: 144 MMHG | DIASTOLIC BLOOD PRESSURE: 86 MMHG | RESPIRATION RATE: 16 BRPM

## 2018-02-27 DIAGNOSIS — F32.89: ICD-10-CM

## 2018-02-27 DIAGNOSIS — F10.20: Primary | ICD-10-CM

## 2018-02-27 DIAGNOSIS — F17.200: ICD-10-CM

## 2018-02-27 LAB — PLATELET # BLD: 175 10^3/UL (ref 150–400)

## 2018-02-27 PROCEDURE — G0480 DRUG TEST DEF 1-7 CLASSES: HCPCS

## 2018-02-27 NOTE — EDPHY
H & P


Time Seen by Provider: 02/27/18 11:20


HPI/ROS: 





CHIEF COMPLAINT: "I'm suicidal"





HISTORY OF PRESENT ILLNESS:  37-year-old male seen emergency department 

yesterday for acute alcohol withdrawal and suicidal ideations.  He was 

subsequently seen by mental health evaluator and they recommend were vacated 

the M1 hold [facility].  He states that 

he is currently feeling significantly improved regarding his alcohol withdrawal 

symptoms, is feeling improvement with the benzodiazepine however early this 

morning his suicidal ideations returned.  His plan is to hang himself or to die 

"a painless way".  He is not specifically sure how he would kill himself in a 

"painless way" however describes doing research online in to variety of ways to 

kill himself.








REVIEW OF SYSTEMS:


A ten point review of systems was performed and is negative with the exception 

of the items mentioned in the HPI








PAST MEDICAL & SURGICAL  HISTORY:  Depression.  Alcoholism.





SOCIAL HISTORY:Last drink of alcohol 5 days ago     














************


PHYSICAL EXAM





(Prior to examination, patient consented to physical exam, hands were washed 

and my usual and customary physical exam procedures followed)


1) GENERAL: Well-developed, well-nourished, alert and oriented.  Depressed, 

flat affect, tearful


2) HEAD: Normocephalic, atraumatic


3) HEENT: Pupils equal, round, reactive to light bilaterally.  Sclera 

anicteric. 


4) NECK: Full range of motion, no meningeal signs.


5) LUNGS: Clear auscultation bilaterally, no wheezes, no rhonchi, no 

retractions.   


6) HEART: Regular rate and rhythm, no murmur, no heave, no gallop.


7) ABDOMEN: No guarding, no rebound, no focal tenderness,


8) MUSCULOSKELETAL: Moving all extremities, no focal areas of tenderness, no 

obvious trauma.  No peripheral edema or discoloration.


9) BACK: No CVA tenderness, no midline vertebral tenderness, no fluctuance, no 

step-off, no obvious trauma, no visual or palpable abnormality. 


10) SKIN: No rash, no petechiae. 


11) Psychiatric:  Patient is oriented X 3, there is no agitation.








***************





DIFFERENTIAL DIAGNOSIS:  In no particular include but limited to depression, 

suicidal ideation, homicidal ideation, acute alcohol withdrawal





Smoking Status: Current every day smoker


Constitutional: 


 Initial Vital Signs











Temperature (C)  36.3 C   02/27/18 11:16


 


Heart Rate  70   02/27/18 11:16


 


Respiratory Rate  18   02/27/18 11:16


 


Blood Pressure  144/76 H  02/27/18 11:16


 


O2 Sat (%)  100   02/27/18 11:16








 











O2 Delivery Mode               Room Air














Allergies/Adverse Reactions: 


 





No Known Allergies Allergy (Verified 02/27/18 11:16)


 








Home Medications: 














 Medication  Instructions  Recorded


 


Ativan  02/27/18


 


Librium 10 mg (RX)  02/27/18














MDM/Departure





- MDM


Medications Given: 


 





Nicotine Polacrilex (Nicorette)  2 mg B PRN PRN


   PRN Reason: Nicotine Withdrawal


   Stop: 08/26/18 13:30


   Last Admin: 02/27/18 13:36 Dose:  2 mg





Discontinued Medications





Ibuprofen (Motrin)  600 mg PO EDNOW ONE


   Stop: 02/27/18 13:32


   Last Admin: 02/27/18 13:37 Dose:  600 mg


Lorazepam (Ativan)  1 mg PO EDNOW ONE


   Stop: 02/27/18 12:05


   Last Admin: 02/27/18 12:08 Dose:  1 mg


Lorazepam (Ativan)  1 mg PO EDNOW ONE


   Stop: 02/27/18 13:31


   Last Admin: 02/27/18 13:36 Dose:  1 mg


Nicotine (Nicoderm Cq)  21 mg TD EDNOW ONE


   Stop: 02/27/18 11:53


   Last Admin: 02/27/18 11:58 Dose:  21 mg





ED Course/Re-evaluation: 





11:50 a.m.:  I reviewed this patient's medical records.  Compared to 24 hr ago 

when I initially evaluated the patient he appears significantly improved 

regarding his alcohol withdrawal symptoms.  I think that encephalopathy, 

delirium tremens is less than likely.  He does actively endorse suicidal 

ideation with plan.  I am recommending placing the patient on M1 hold in 

consultation with Dr. Aiden Sauer.  We will await mental health evaluation.





2:25 p.m.:  The mental health evaluator has evaluated the patient and I had a 

discussion with the family.  I was informed at this time that they recommend 

vacated the M1 hold, the patient will be staying with the father , the patient 

has contracted for safety, is not actively endorsing suicidal ideation, has a 

plan to go to the [facility] dual diagnosis center for admission tomorrow.





- Depart


Disposition: Home, Routine, Self-Care


Clinical Impression: 


 Alcoholism





Depression


Qualifiers:


 Depression Type: other depression Qualified Code(s): F32.89 - Other specified 

depressive episodes





Condition: Good


Instructions:  Depression (ED), Alcohol Withdrawal (ED)


Additional Instructions: 


Return to the ER if you develop thoughts of hurting herself or others or any 

other symptoms that concern you


Referrals: 


Keep, your appointment at [facility] tomorrow [Other] - As per Instructions

## 2018-03-31 ENCOUNTER — HOSPITAL ENCOUNTER (EMERGENCY)
Dept: HOSPITAL 80 - FED | Age: 38
Discharge: HOME | End: 2018-03-31
Payer: MEDICAID

## 2018-03-31 VITALS — RESPIRATION RATE: 16 BRPM | TEMPERATURE: 96.8 F

## 2018-03-31 VITALS — SYSTOLIC BLOOD PRESSURE: 150 MMHG | OXYGEN SATURATION: 94 % | DIASTOLIC BLOOD PRESSURE: 90 MMHG | HEART RATE: 74 BPM

## 2018-03-31 DIAGNOSIS — F41.9: Primary | ICD-10-CM

## 2018-03-31 DIAGNOSIS — F10.239: ICD-10-CM

## 2018-03-31 DIAGNOSIS — F17.200: ICD-10-CM

## 2024-11-17 NOTE — EDPHY
H & P


Stated Complaint: panic attack, pt worried about "dts" not taking meds


Time Seen by Provider: 03/31/18 18:45


HPI/ROS: 





CHIEF COMPLAINT: "I am Freaking out"  





HISTORY OF PRESENT ILLNESS:  38-year-old male history of alcoholism, anxiety, 

depression, released from [facility] 3 days ago for alcohol detoxification, 

started drinking again last night, last drink of alcohol was last evening.  He 

is in the ER complaining of high levels of anxiety, hyperventilation, carpal 

pedal spasms.  Denies suicidal or homicidal ideation.  Denies self-injury.  No 

seizure.  No hallucination.








REVIEW OF SYSTEMS:


A ten point review of systems was performed and is negative with the exception 

of the items mentioned in the HPI








PAST MEDICAL & SURGICAL  HISTORY:  Alcoholism.  Anxiety.  Depression.





SOCIAL HISTORY: Last drink of alcohol last night    














************


PHYSICAL EXAM





(Prior to examination, patient consented to physical exam, hands were washed 

and my usual and customary physical exam procedures followed)


1) GENERAL: Well-developed, well-nourished, alert and oriented.  Appears anxious

, hyperventilating


2) HEAD: Normocephalic, atraumatic


3) HEENT: Pupils equal, round, reactive to light bilaterally.  Sclera anicteric.


4) NECK: Full range of motion, no meningeal signs.


5) LUNGS: Clear auscultation bilaterally, no wheezes, no rhonchi, no 

retractions.   


6) HEART: Regular rate and rhythm, no murmur, no heave, no gallop.


7) ABDOMEN: No guarding, no rebound, no focal tenderness,,


8) MUSCULOSKELETAL: No peripheral edema or discoloration.


9) BACK: No obvious trauma, no visual or palpable abnormality. 


10) SKIN: No rash, no petechiae. 


11) Psychiatric:  Patient is oriented X 3, there is no agitation.








***************





DIFFERENTIAL DIAGNOSIS:  In no particular order including but not limited to 

acute anxiety, delirium tremens, acute alcohol withdrawal, suicidal ideation, 

homicidal ideation 








- Personal History


Tetanus Vaccine Date: LESS THAN 10 YEARS





- Medical/Surgical History


Hx Asthma: No


Hx Chronic Respiratory Disease: No


Hx Diabetes: No


Hx Cardiac Disease: No


Hx Renal Disease: No


Hx Cirrhosis: No


Hx Alcoholism: No


Hx HIV/AIDS: No


Hx Splenectomy or Spleen Trauma: No


Other PMH: PMHx: anxiety/panic attacks/insomnia, ?bipolar?,ETOH abuse.  PSHx: 

denies





- Social History


Smoking Status: Current every day smoker


Constitutional: 


 Initial Vital Signs











Temperature (C)  36.0 C   03/31/18 18:41


 


Heart Rate  90   03/31/18 18:41


 


Respiratory Rate  16   03/31/18 18:41


 


Blood Pressure  145/95 H  03/31/18 18:41


 


O2 Sat (%)  98   03/31/18 18:41








 











O2 Delivery Mode               Room Air














Allergies/Adverse Reactions: 


 





No Known Allergies Allergy (Verified 02/27/18 11:16)


 








Home Medications: 














 Medication  Instructions  Recorded


 


Gabapentin  03/31/18


 


LORazepam [Ativan 1 mg (RX)] 1 mg PO Q6 PRN #3 tab 03/31/18


 


Propranolol Sr  03/31/18


 


Seroquel  03/31/18


 


buPROPion  03/31/18














Medical Decision Making


ED Course/Re-evaluation: 





6:58 p.m.:  Old medical records reviewed.  Will administer oral benzodiazepine 

as I think that he has baseline components of anxiety which are more than 

likely exacerbated by more than likely acute alcohol withdrawal.  He denies 

suicidal or homicidal ideation at this time.





7:50 p.m.:  Patient requested I speak with his aunt in [place] who is a 

retired nurse practitioner.  He signed medical release form prior to my having 

this conversation which I had with the patient, his mother and his aunt all on 

speaker phone in his room.  He is feeling significant improvement with 

benzodiazepine.  Denies suicidal or homicidal ideation.  He appears more calm.  

States that he is worried about what will happen when his benzodiazepine runs 

out.  [facility] however he states 

that he has had unpleasant experiences there.  He does agree to go to the 

mental health crisis Center.  For his high levels of anxiety  I have agreed to 

provide him with 3 Ativan 1 mg tablets.  I do not think he meets criteria for 

an M1 hold at this time.  He is agreeable with this plan.





- Data Points


Medications Given: 


 








Discontinued Medications





Lorazepam (Ativan)  2 mg PO EDNOW ONE


   Stop: 03/31/18 18:57


   Last Admin: 03/31/18 18:58 Dose:  2 mg








Departure





- Departure


Disposition: Home, Routine, Self-Care


Clinical Impression: 


 Anxiety





Alcohol withdrawal


Qualifiers:


 Complication of substance-induced condition: with unspecified complication 

Qualified Code(s): F10.239 - Alcohol dependence with withdrawal, unspecified





Condition: Good


Instructions:  Alcohol Withdrawal (ED), Anxiety (ED)


Additional Instructions: 


Return to the ER immediately if you experience thoughts of hurting yourself, 

thoughts of hurting other people, thoughts of killing other people or killing 

yourself.


Referrals: 


[facility] [Clinic] - As per Instructions ([facility] to [facility] at [address])


Prescriptions: 


LORazepam [Ativan 1 mg (RX)] 1 mg PO Q6 PRN #3 tab


 PRN Reason: Anxiety Patient and sitter in good spirits